# Patient Record
Sex: MALE | Race: WHITE | Employment: OTHER | URBAN - METROPOLITAN AREA
[De-identification: names, ages, dates, MRNs, and addresses within clinical notes are randomized per-mention and may not be internally consistent; named-entity substitution may affect disease eponyms.]

---

## 2021-06-14 RX ORDER — ATORVASTATIN CALCIUM 10 MG/1
TABLET, FILM COATED ORAL DAILY
COMMUNITY

## 2021-06-14 RX ORDER — CALC/MAG/B COMPLEX/D3/HERB 61
TABLET ORAL
COMMUNITY
End: 2021-06-22

## 2021-06-14 RX ORDER — PERINDOPRIL ERBUMINE 4 MG/1
4 TABLET ORAL DAILY
COMMUNITY

## 2021-06-14 NOTE — PROGRESS NOTES
[address]  Endoscopy Preprocedure Instructions      1. On the day of your procedure, please report to registration located on the 2nd floor of the  [facility]. yes    2. You must have a responsible adult to drive you to the hospital, stay at the hospital during your procedure and drive you home. If they leave your procedure will not be started (no exceptions). yes    3. Do not have anything to eat or drink (including water, gum, mints, coffee, and juice) after midnight. This does not apply to the medications you were instructed to take by your physician. yesIf you are currently taking Plavix, Coumadin, Aspirin, or other blood-thinning agents, contact your physician for special instructions. yes    4. If you are having a procedure that requires bowel prep: We recommend that you have only clear liquids the day before your procedure and begin your bowel prep by 5:00 pm.  You may continue to drink clear liquids until midnight. If for any reason you are not able to complete your prep please notify your physician immediately. yes    5. Have a list of all current medications, including vitamins, herbal supplements and any other over the counter medications. yes    6. If you wear glasses, contacts, dentures and/or hearing aids, they may be removed prior to procedure, please bring a case to store them in. yes    7. You should understand that if you do not follow these instructions your procedure may be cancelled. If your physical condition changes (I.e.difficulty breathing, fever, cold or flu) please contact your doctor as soon as possible. 8. It is important that you be on time. If for any reason you are unable to keep your appointment please call your physicians office prior to your scheduled procedure    Gordo (daughter) - asked to talked her dad to get a rapid coivid-19 test before the procedure   since he is not fully vaccinated.

## 2021-06-15 ENCOUNTER — HOSPITAL ENCOUNTER (OUTPATIENT)
Age: 76
Setting detail: OUTPATIENT SURGERY
Discharge: HOME | End: 2021-06-15
Attending: INTERNAL MEDICINE | Admitting: INTERNAL MEDICINE
Payer: MEDICAID

## 2021-06-15 ENCOUNTER — ANESTHESIA (OUTPATIENT)
Dept: ENDOSCOPY | Age: 76
End: 2021-06-15
Payer: MEDICAID

## 2021-06-15 ENCOUNTER — ANESTHESIA EVENT (OUTPATIENT)
Dept: ENDOSCOPY | Age: 76
End: 2021-06-15
Payer: MEDICAID

## 2021-06-15 VITALS
BODY MASS INDEX: 25.03 KG/M2 | HEIGHT: 70 IN | TEMPERATURE: 98.6 F | DIASTOLIC BLOOD PRESSURE: 68 MMHG | OXYGEN SATURATION: 100 % | SYSTOLIC BLOOD PRESSURE: 136 MMHG | WEIGHT: 174.82 LBS | HEART RATE: 58 BPM | RESPIRATION RATE: 14 BRPM

## 2021-06-15 PROCEDURE — 88305 TISSUE EXAM BY PATHOLOGIST: CPT

## 2021-06-15 PROCEDURE — 74011250636 HC RX REV CODE- 250/636: Performed by: INTERNAL MEDICINE

## 2021-06-15 PROCEDURE — 77030021593 HC FCPS BIOP ENDOSC BSC -A: Performed by: INTERNAL MEDICINE

## 2021-06-15 PROCEDURE — 74011250636 HC RX REV CODE- 250/636: Performed by: NURSE ANESTHETIST, CERTIFIED REGISTERED

## 2021-06-15 PROCEDURE — 74011000250 HC RX REV CODE- 250: Performed by: NURSE ANESTHETIST, CERTIFIED REGISTERED

## 2021-06-15 PROCEDURE — 76040000019: Performed by: INTERNAL MEDICINE

## 2021-06-15 PROCEDURE — 2709999900 HC NON-CHARGEABLE SUPPLY: Performed by: INTERNAL MEDICINE

## 2021-06-15 PROCEDURE — 77030013992 HC SNR POLYP ENDOSC BSC -B: Performed by: INTERNAL MEDICINE

## 2021-06-15 PROCEDURE — 76060000031 HC ANESTHESIA FIRST 0.5 HR: Performed by: INTERNAL MEDICINE

## 2021-06-15 RX ORDER — DEXTROMETHORPHAN/PSEUDOEPHED 2.5-7.5/.8
1.2 DROPS ORAL
Status: DISCONTINUED | OUTPATIENT
Start: 2021-06-15 | End: 2021-06-15 | Stop reason: HOSPADM

## 2021-06-15 RX ORDER — EPINEPHRINE 0.1 MG/ML
1 INJECTION INTRACARDIAC; INTRAVENOUS
Status: DISCONTINUED | OUTPATIENT
Start: 2021-06-15 | End: 2021-06-15 | Stop reason: HOSPADM

## 2021-06-15 RX ORDER — ATROPINE SULFATE 0.1 MG/ML
0.4 INJECTION INTRAVENOUS
Status: DISCONTINUED | OUTPATIENT
Start: 2021-06-15 | End: 2021-06-15 | Stop reason: HOSPADM

## 2021-06-15 RX ORDER — PHENYLEPHRINE HCL IN 0.9% NACL 0.4MG/10ML
SYRINGE (ML) INTRAVENOUS AS NEEDED
Status: DISCONTINUED | OUTPATIENT
Start: 2021-06-15 | End: 2021-06-15 | Stop reason: HOSPADM

## 2021-06-15 RX ORDER — LIDOCAINE HYDROCHLORIDE 20 MG/ML
INJECTION, SOLUTION EPIDURAL; INFILTRATION; INTRACAUDAL; PERINEURAL AS NEEDED
Status: DISCONTINUED | OUTPATIENT
Start: 2021-06-15 | End: 2021-06-15 | Stop reason: HOSPADM

## 2021-06-15 RX ORDER — SODIUM CHLORIDE 9 MG/ML
50 INJECTION, SOLUTION INTRAVENOUS CONTINUOUS
Status: DISCONTINUED | OUTPATIENT
Start: 2021-06-15 | End: 2021-06-15 | Stop reason: HOSPADM

## 2021-06-15 RX ORDER — MIDAZOLAM HYDROCHLORIDE 1 MG/ML
.25-5 INJECTION, SOLUTION INTRAMUSCULAR; INTRAVENOUS
Status: DISCONTINUED | OUTPATIENT
Start: 2021-06-15 | End: 2021-06-15 | Stop reason: HOSPADM

## 2021-06-15 RX ORDER — PROPOFOL 10 MG/ML
INJECTION, EMULSION INTRAVENOUS
Status: DISCONTINUED | OUTPATIENT
Start: 2021-06-15 | End: 2021-06-15 | Stop reason: HOSPADM

## 2021-06-15 RX ORDER — PROPOFOL 10 MG/ML
INJECTION, EMULSION INTRAVENOUS AS NEEDED
Status: DISCONTINUED | OUTPATIENT
Start: 2021-06-15 | End: 2021-06-15 | Stop reason: HOSPADM

## 2021-06-15 RX ORDER — NALOXONE HYDROCHLORIDE 0.4 MG/ML
0.4 INJECTION, SOLUTION INTRAMUSCULAR; INTRAVENOUS; SUBCUTANEOUS
Status: DISCONTINUED | OUTPATIENT
Start: 2021-06-15 | End: 2021-06-15 | Stop reason: HOSPADM

## 2021-06-15 RX ORDER — FLUMAZENIL 0.1 MG/ML
0.2 INJECTION INTRAVENOUS
Status: DISCONTINUED | OUTPATIENT
Start: 2021-06-15 | End: 2021-06-15 | Stop reason: HOSPADM

## 2021-06-15 RX ADMIN — Medication 80 MCG: at 09:31

## 2021-06-15 RX ADMIN — PROPOFOL INJECTABLE EMULSION 140 MCG/KG/MIN: 10 INJECTION, EMULSION INTRAVENOUS at 09:14

## 2021-06-15 RX ADMIN — SODIUM CHLORIDE: 9 INJECTION, SOLUTION INTRAVENOUS at 09:00

## 2021-06-15 RX ADMIN — Medication 120 MCG: at 09:23

## 2021-06-15 RX ADMIN — LIDOCAINE HYDROCHLORIDE 40 MG: 20 INJECTION, SOLUTION INTRAVENOUS at 09:13

## 2021-06-15 RX ADMIN — Medication 80 MCG: at 09:36

## 2021-06-15 RX ADMIN — PROPOFOL INJECTABLE EMULSION 80 MG: 10 INJECTION, EMULSION INTRAVENOUS at 09:14

## 2021-06-15 NOTE — ANESTHESIA POSTPROCEDURE EVALUATION
Procedure(s):  ESOPHAGOGASTRODUODENOSCOPY (EGD)  COLONOSCOPY  ESOPHAGOGASTRODUODENAL (EGD) BIOPSY  ENDOSCOPIC POLYPECTOMY. MAC    <BSHSIANPOST>    INITIAL Post-op Vital signs:   Vitals Value Taken Time   /68 06/15/21 0958   Temp 37 °C (98.6 °F) 06/15/21 0943   Pulse 54 06/15/21 1000   Resp 18 06/15/21 1000   SpO2 99 % 06/15/21 1000   Vitals shown include unvalidated device data.

## 2021-06-15 NOTE — H&P
Lilly Spain M.D.  [phone]            History and Physical       NAME:  Pham Mauricio   :      MRN:   [id number]       Referring Physician:  None      Consult Date: 6/15/2021 8:40 AM    Chief Complaint:  Early satiety & Colon cancer screening    History of Present Illness:  Patient is a 78 y. o. who is seen for early satiety& colon cancer screening. Denies any ongoing GI complaints. PMH:  Past Medical History:   Diagnosis Date    Hypertension     Ill-defined condition     High cholesterol        PSH:  History reviewed. No pertinent surgical history. Allergies:  No Known Allergies    Home Medications:  Prior to Admission Medications   Prescriptions Last Dose Informant Patient Reported? Taking?   atorvastatin (LIPITOR) 10 mg tablet 2021 at Unknown time  Yes Yes   Sig: Take  by mouth daily. lansoprazole (Prevacid) 15 mg capsule 2021 at Unknown time  Yes Yes   Sig: Take  by mouth Daily (before breakfast). perindopril (ACEON) 4 mg tablet 2021 at Unknown time  Yes Yes   Sig: Take 4 mg by mouth daily. Facility-Administered Medications: None       Hospital Medications:  No current facility-administered medications for this encounter. Social History:  Social History     Tobacco Use    Smoking status: Never Smoker    Smokeless tobacco: Never Used   Substance Use Topics    Alcohol use: Never       Family History:  History reviewed. No pertinent family history.           Review of Systems:      Constitutional: negative fever, negative chills, negative weight loss  Eyes:   negative visual changes  ENT:   negative sore throat, tongue or lip swelling  Respiratory:  negative cough, negative dyspnea  Cards:  negative for chest pain, palpitations, lower extremity edema  GI:   See HPI  :  negative for frequency, dysuria  Integument:  negative for rash and pruritus  Heme:  negative for easy bruising and gum/nose bleeding  Musculoskel: negative for myalgias,  back pain and muscle weakness  Neuro: negative for headaches, dizziness, vertigo  Psych:  negative for feelings of anxiety, depression       Objective:     Patient Vitals for the past 8 hrs:   BP Temp Pulse Resp SpO2 Height Weight   06/15/21 0834 (!) 155/78 98.8 °F (37.1 °C) 78 18 100 % 5' 10\" (1.778 m) 79.3 kg (174 lb 13.2 oz)     No intake/output data recorded. No intake/output data recorded. EXAM:     NEURO-a&o   HEENT-wnl   LUNGS-clear    COR-regular rate and rhythym     ABD-soft , no tenderness, no rebound, good bs     EXT-no edema     Data Review     No results for input(s): WBC, HGB, HCT, PLT, HGBEXT, HCTEXT, PLTEXT in the last 72 hours. No results for input(s): NA, K, CL, CO2, BUN, CREA, GLU, PHOS, CA in the last 72 hours. No results for input(s): AP, TBIL, TP, ALB, GLOB, GGT, AML, LPSE in the last 72 hours. No lab exists for component: SGOT, GPT, AMYP, HLPSE  No results for input(s): INR, PTP, APTT, INREXT in the last 72 hours. There is no problem list on file for this patient. Assessment:   · Early satiety  · Colon cancer screening   Plan:   · EGD  · Colonoscopy today.      Signed By: Artelia Essex, MD     6/15/2021  8:40 AM

## 2021-06-15 NOTE — PROGRESS NOTES

## 2021-06-15 NOTE — ANESTHESIA PREPROCEDURE EVALUATION
Relevant Problems   No relevant active problems       Anesthetic History   No history of anesthetic complications            Review of Systems / Medical History  Patient summary reviewed, nursing notes reviewed and pertinent labs reviewed    Pulmonary  Within defined limits                 Neuro/Psych   Within defined limits           Cardiovascular    Hypertension              Exercise tolerance: >4 METS     GI/Hepatic/Renal  Within defined limits              Endo/Other  Within defined limits           Other Findings              Physical Exam    Airway  Mallampati: II    Neck ROM: normal range of motion   Mouth opening: Normal     Cardiovascular  Regular rate and rhythm,  S1 and S2 normal,  no murmur, click, rub, or gallop  Rhythm: regular  Rate: normal         Dental  No notable dental hx       Pulmonary  Breath sounds clear to auscultation               Abdominal  GI exam deferred       Other Findings            Anesthetic Plan    ASA: 2  Anesthesia type: MAC          Induction: Intravenous  Anesthetic plan and risks discussed with: Patient

## 2021-06-15 NOTE — DISCHARGE INSTRUCTIONS
[facility]. Mima Mahoney M.D.  [phone]                 COLON and EGD DISCHARGE INSTRUCTIONS    6/15/2021    Zamzam Marie  :  1945  [facility] Medical Record Number:  [id number]      DISCOMFORT:  Sore throat- throat lozenges or warm salt water gargle  Redness at IV site- apply warm compress to area; if redness or soreness persist- contact your physician  There may be a slight amount of blood passed from the rectum  Gaseous discomfort- walking, belching will help relieve any discomfort  You may not operate a vehicle for 12 hours  You may not engage in an occupation involving machinery or appliances for rest of today  You may not drink alcoholic beverages for at least 12 hours  Avoid making any critical decisions for at least 24 hour  DIET:   High fiber diet. - however -  remember your colon is empty and a heavy meal will produce gas. Avoid these foods:  vegetables, fried / greasy foods, carbonated drinks for today     ACTIVITY:  You may  resume your normal daily activities it is recommended that you spend the remainder of the day resting -  avoid any strenuous activity and driving. CALL M.D. ANY SIGN OF:   Increasing pain, nausea, vomiting  Abdominal distension (swelling)  New increased bleeding (oral or rectal)  Fever (chills)  Pain in chest area  Bloody discharge from nose or mouth  Shortness of breath      Follow-up Instructions:   Call Dr. Solange Chavez if any questions at [phone]. Results of procedure / biopsy in 7 to 10 days, we will call you with these results.   Your endoscopy showed normal exam   Your colonoscopy showed 4 polyps removed

## 2021-06-15 NOTE — PROCEDURES
Lilly Spain M.D.  [phone]            6/15/2021          Colonoscopy Operative Report  Pham Mauricio  :  1945  [facility] Medical Record Number:  [id number]      Indications:    Screening colonoscopy     :  Lonnie Davis MD    Assistant -- None  Implants -- None    Referring Provider: None    Sedation:  MAC anesthesia Propofol    Pre-Procedural Exam:      Airway: clear,  No airway problems anticipated  Heart: RRR, without gallops or rubs  Lungs: clear bilaterally without wheezes, crackles, or rhonchi  Abdomen: soft, nontender, nondistended, bowel sounds present  Mental Status: awake, alert and oriented to person, place and time     Procedure Details:  After informed consent was obtained with all risks and benefits of procedure explained and preoperative exam completed, the patient was taken to the endoscopy suite and placed in the left lateral decubitus position. Upon sequential sedation as per above, a digital rectal exam was performed. The Olympus videocolonoscope  was inserted in the rectum and carefully advanced to the terminal ileum. The quality of preparation was good. The colonoscope was slowly withdrawn with careful inspection and evaluation between folds. Retroflexion in the rectum was performed. Findings:   Terminal Ileum: normal  Cecum: normal  Ascending Colon: 3  Sessile polyp(s), the largest 9 mm in size;  Transverse Colon: 1  Sessile polyp(s), the largest 9 mm in size; Descending Colon: normal  Sigmoid: normal  Rectum: normal    Interventions:  4 complete polypectomy were performed using cold snare  and the polyps were  retrieved    Specimen Removed:  specimen #1, 9 mm in size, located in the ascending colon and the transverse colon removed by cold snare and retrieved for pathology    Complications: None. EBL:  None. Impression:  4 polyps removed as above    Recommendations:  -Await pathology. -Repeat colonoscopy in 3 years. -High fiber diet.    -Resume normal medication(s). Discharge Disposition:  Home in the company of a  when able to ambulate.     Cecy Bazan MD  6/15/2021  9:42 AM

## 2021-06-15 NOTE — PROGRESS NOTES
[place] Leader  1945  [id number]    Situation:  Verbal report received from:   Fernando Epperson RN   Procedure: Procedure(s):  ESOPHAGOGASTRODUODENOSCOPY (EGD)  COLONOSCOPY  ESOPHAGOGASTRODUODENAL (EGD) BIOPSY  ENDOSCOPIC POLYPECTOMY    Background:    Preoperative diagnosis: colon screening  Postoperative diagnosis: 1.- Nornal EGD  2.- Colonic Polyps    :  Dr. Ricardo Wilson   Assistant(s): Endoscopy RN-1: Jeremy Pires RN  Endoscopy RN-2: Eric Bowen RN    Specimens:   ID Type Source Tests Collected by Time Destination   1 : Duodenum Biopsy Preservative   Keyonna Benz MD 6/15/2021 3858 Pathology   2 : Gastric Biopsy Presyingative   Keyonna Benz MD 6/15/2021 0021 Pathology   3 : Ascending Colon Polyps x 3 Preservative   Keyonna Benz MD 6/15/2021 9403 Pathology   4 : Transverse Colon Polyp Preservative   Keyonna Benz MD 6/15/2021 0935 Pathology     H. Pylori  no    Assessment:  Intra-procedure medications       Anesthesia gave intra-procedure sedation and medications, see anesthesia flow sheet yes    Intravenous fluids: NS@ KVO     Vital signs stable   yes    Abdominal assessment: round and soft   yes    Recommendation:  Discharge patient per MD order  yes.   Return to floor  outpatient  Family or Friend   spouse  Permission to share finding with family or friend yes

## 2021-06-15 NOTE — PROCEDURES
Angie Mcdowell M.D.  [phone]           6/15/2021                EGD Operative Report  Nadia Ritter  :  1945  [facility] Medical Record Number:  [id number]      Indication: early satiety     : Juan Clemens MD    Assistant -- None  Implants -- None    Referring Provider:  None      Anesthesia/Sedation:  MAC anesthesia Propofol    Airway assessment: No airway problems anticipated    Pre-Procedural Exam:      Airway: clear, no airway problems anticipated  Heart: RRR, without gallops or rubs  Lungs: clear bilaterally without wheezes, crackles, or rhonchi  Abdomen: soft, nontender, nondistended, bowel sounds present  Mental Status: awake, alert and oriented to person, place and time       Procedure Details     After infomed consent was obtained for the procedure, with all risks and benefits of procedure explained the patient was taken to the endoscopy suite and placed in the left lateral decubitus position. Following sequential administration of sedation as per above, the endoscope was inserted into the mouth and advanced under direct vision to second portion of the duodenum. A careful inspection was made as the gastroscope was withdrawn, including a retroflexed view of the proximal stomach; findings and interventions are described below. Findings:   Esophagus:normal  Stomach: normal   Duodenum/jejunum: normal    Therapies:  biopsy of stomach - r/o H.pylori  biopsy of duodenal - r/o celiac disease    Specimens: as above           Complications:   None; patient tolerated the procedure well. EBL:  None.            Impression:    1.- Nornal EGD      Recommendations:    -Await pathology.  -Proceed with colonoscopy today as planned    Juan Clemens MD

## 2021-06-15 NOTE — PROGRESS NOTES
Endoscopy discharge instructions have been reviewed and given to patient. The patient verbalized understanding and acceptance of instructions. Dr. Ophelia Castillo  discussed with patient procedure findings and next steps.

## 2021-06-22 ENCOUNTER — OFFICE VISIT (OUTPATIENT)
Dept: CARDIOLOGY CLINIC | Age: 76
End: 2021-06-22
Payer: MEDICAID

## 2021-06-22 ENCOUNTER — TELEPHONE (OUTPATIENT)
Dept: CARDIOLOGY CLINIC | Age: 76
End: 2021-06-22

## 2021-06-22 VITALS
WEIGHT: 180.1 LBS | HEART RATE: 60 BPM | HEIGHT: 70 IN | BODY MASS INDEX: 25.78 KG/M2 | RESPIRATION RATE: 16 BRPM | SYSTOLIC BLOOD PRESSURE: 140 MMHG | DIASTOLIC BLOOD PRESSURE: 70 MMHG | OXYGEN SATURATION: 99 %

## 2021-06-22 DIAGNOSIS — R00.2 HEART PALPITATIONS: ICD-10-CM

## 2021-06-22 DIAGNOSIS — E78.2 MIXED HYPERLIPIDEMIA: ICD-10-CM

## 2021-06-22 DIAGNOSIS — I10 ESSENTIAL HYPERTENSION: ICD-10-CM

## 2021-06-22 DIAGNOSIS — R07.2 PRECORDIAL PAIN: Primary | ICD-10-CM

## 2021-06-22 PROCEDURE — 93000 ELECTROCARDIOGRAM COMPLETE: CPT | Performed by: INTERNAL MEDICINE

## 2021-06-22 RX ORDER — DICLOFENAC SODIUM 75 MG/1
75 TABLET, DELAYED RELEASE ORAL AS NEEDED
COMMUNITY
Start: 2021-06-17

## 2021-06-22 RX ORDER — AMOXICILLIN 500 MG/1
500 CAPSULE ORAL 2 TIMES DAILY
COMMUNITY
Start: 2021-06-17

## 2021-06-22 RX ORDER — CLARITHROMYCIN 500 MG/1
500 TABLET, FILM COATED ORAL 2 TIMES DAILY
COMMUNITY
Start: 2021-06-17

## 2021-06-22 NOTE — TELEPHONE ENCOUNTER
Called and spoke to the daughter and told her he is  scheduled for his echo at 8am and stress test at 9:30 tomorrow.     Regards,  godfrey

## 2021-06-22 NOTE — PROGRESS NOTES
Chief Complaint   Patient presents with    Referral / Consult     Patient C/O chest pain , increase heart rate, tingling numbness in hands and back of neck.

## 2021-06-23 ENCOUNTER — ANCILLARY PROCEDURE (OUTPATIENT)
Dept: CARDIOLOGY CLINIC | Age: 76
End: 2021-06-23
Payer: MEDICAID

## 2021-06-23 ENCOUNTER — ANCILLARY PROCEDURE (OUTPATIENT)
Dept: CARDIOLOGY CLINIC | Age: 76
End: 2021-06-23

## 2021-06-23 VITALS
HEIGHT: 70 IN | WEIGHT: 180 LBS | DIASTOLIC BLOOD PRESSURE: 70 MMHG | SYSTOLIC BLOOD PRESSURE: 140 MMHG | BODY MASS INDEX: 25.77 KG/M2

## 2021-06-23 VITALS
SYSTOLIC BLOOD PRESSURE: 148 MMHG | HEIGHT: 70 IN | DIASTOLIC BLOOD PRESSURE: 82 MMHG | BODY MASS INDEX: 25.77 KG/M2 | WEIGHT: 180 LBS

## 2021-06-23 DIAGNOSIS — E78.2 MIXED HYPERLIPIDEMIA: ICD-10-CM

## 2021-06-23 DIAGNOSIS — R00.2 HEART PALPITATIONS: ICD-10-CM

## 2021-06-23 DIAGNOSIS — R07.2 PRECORDIAL PAIN: ICD-10-CM

## 2021-06-23 DIAGNOSIS — I10 ESSENTIAL HYPERTENSION: ICD-10-CM

## 2021-06-23 PROCEDURE — 78452 HT MUSCLE IMAGE SPECT MULT: CPT | Performed by: INTERNAL MEDICINE

## 2021-06-23 PROCEDURE — 93306 TTE W/DOPPLER COMPLETE: CPT | Performed by: INTERNAL MEDICINE

## 2021-06-23 PROCEDURE — 93015 CV STRESS TEST SUPVJ I&R: CPT | Performed by: INTERNAL MEDICINE

## 2021-06-23 PROCEDURE — A9502 TC99M TETROFOSMIN: HCPCS | Performed by: INTERNAL MEDICINE

## 2021-06-24 ENCOUNTER — HOSPITAL ENCOUNTER (OUTPATIENT)
Dept: GENERAL RADIOLOGY | Age: 76
Discharge: HOME | End: 2021-06-24
Attending: INTERNAL MEDICINE
Payer: MEDICAID

## 2021-06-24 ENCOUNTER — TELEPHONE (OUTPATIENT)
Dept: CARDIOLOGY CLINIC | Age: 76
End: 2021-06-24

## 2021-06-24 ENCOUNTER — OFFICE VISIT (OUTPATIENT)
Dept: PRIMARY CARE CLINIC | Age: 76
End: 2021-06-24
Payer: MEDICAID

## 2021-06-24 ENCOUNTER — TELEPHONE (OUTPATIENT)
Dept: PRIMARY CARE CLINIC | Age: 76
End: 2021-06-24

## 2021-06-24 VITALS
OXYGEN SATURATION: 99 % | HEART RATE: 69 BPM | DIASTOLIC BLOOD PRESSURE: 76 MMHG | WEIGHT: 179 LBS | HEIGHT: 70 IN | RESPIRATION RATE: 18 BRPM | BODY MASS INDEX: 25.62 KG/M2 | SYSTOLIC BLOOD PRESSURE: 133 MMHG | TEMPERATURE: 97.9 F

## 2021-06-24 DIAGNOSIS — I10 ESSENTIAL HYPERTENSION: ICD-10-CM

## 2021-06-24 DIAGNOSIS — E55.9 VITAMIN D DEFICIENCY: ICD-10-CM

## 2021-06-24 DIAGNOSIS — R68.82 LOSS OF LIBIDO: ICD-10-CM

## 2021-06-24 DIAGNOSIS — R73.03 PREDIABETES: ICD-10-CM

## 2021-06-24 DIAGNOSIS — R61 NIGHT SWEATS: ICD-10-CM

## 2021-06-24 DIAGNOSIS — D36.9 TUBULAR ADENOMA: ICD-10-CM

## 2021-06-24 DIAGNOSIS — Z12.5 PROSTATE CANCER SCREENING: ICD-10-CM

## 2021-06-24 DIAGNOSIS — Z00.00 ANNUAL PHYSICAL EXAM: Primary | ICD-10-CM

## 2021-06-24 DIAGNOSIS — A04.8 H. PYLORI INFECTION: ICD-10-CM

## 2021-06-24 DIAGNOSIS — E78.00 HYPERCHOLESTEREMIA: ICD-10-CM

## 2021-06-24 DIAGNOSIS — R06.09 DOE (DYSPNEA ON EXERTION): ICD-10-CM

## 2021-06-24 DIAGNOSIS — H91.93 BILATERAL HEARING LOSS, UNSPECIFIED HEARING LOSS TYPE: ICD-10-CM

## 2021-06-24 LAB
ECHO AO ASC DIAM: 3.16 CM
ECHO AO ROOT DIAM: 2.58 CM
ECHO AV AREA PEAK VELOCITY: 2.55 CM2
ECHO AV AREA/BSA PEAK VELOCITY: 1.3 CM2/M2
ECHO AV PEAK GRADIENT: 6.82 MMHG
ECHO AV PEAK VELOCITY: 130.6 CM/S
ECHO LA AREA 4C: 19.53 CM2
ECHO LA MAJOR AXIS: 3.51 CM
ECHO LA MINOR AXIS: 1.76 CM
ECHO LA VOL 2C: 48.06 ML (ref 18–58)
ECHO LA VOL 4C: 51.02 ML (ref 18–58)
ECHO LA VOL BP: 55.02 ML (ref 18–58)
ECHO LA VOL/BSA BIPLANE: 27.51 ML/M2 (ref 16–28)
ECHO LA VOLUME INDEX A2C: 24.03 ML/M2 (ref 16–28)
ECHO LA VOLUME INDEX A4C: 25.51 ML/M2 (ref 16–28)
ECHO LV E' LATERAL VELOCITY: 12.82 CM/S
ECHO LV E' SEPTAL VELOCITY: 9.34 CM/S
ECHO LV INTERNAL DIMENSION DIASTOLIC: 4.62 CM (ref 4.2–5.9)
ECHO LV INTERNAL DIMENSION SYSTOLIC: 3.12 CM
ECHO LV IVSD: 1.07 CM (ref 0.6–1)
ECHO LV MASS 2D: 172.2 G (ref 88–224)
ECHO LV MASS INDEX 2D: 86.1 G/M2 (ref 49–115)
ECHO LV POSTERIOR WALL DIASTOLIC: 1.04 CM (ref 0.6–1)
ECHO LVOT DIAM: 2 CM
ECHO LVOT PEAK GRADIENT: 4.53 MMHG
ECHO LVOT PEAK VELOCITY: 106.37 CM/S
ECHO LVOT SV: 78.8 ML
ECHO LVOT VTI: 25.14 CM
ECHO MV A VELOCITY: 64.57 CM/S
ECHO MV E DECELERATION TIME (DT): 225.4 MS
ECHO MV E VELOCITY: 84.63 CM/S
ECHO MV E/A RATIO: 1.31
ECHO MV E/E' LATERAL: 6.6
ECHO MV E/E' RATIO (AVERAGED): 7.83
ECHO MV E/E' SEPTAL: 9.06
ECHO RA AREA 4C: 13.73 CM2
ECHO RV TAPSE: 2.5 CM (ref 1.5–2)
LA VOL DISK BP: 51.13 ML (ref 18–58)
LVOT MG: 2.49 MMHG
STRESS BASELINE DIAS BP: 82 MMHG
STRESS BASELINE HR: 60 BPM
STRESS BASELINE SYS BP: 148 MMHG
STRESS PEAK DIAS BP: 82 MMHG
STRESS PEAK SYS BP: 148 MMHG
STRESS PERCENT HR ACHIEVED: 64 %
STRESS POST PEAK HR: 93 BPM
STRESS RATE PRESSURE PRODUCT: NORMAL BPM*MMHG
STRESS ST DEPRESSION: 0 MM
STRESS ST ELEVATION: 0 MM
STRESS TARGET HR: 146 BPM

## 2021-06-24 PROCEDURE — 99387 INIT PM E/M NEW PAT 65+ YRS: CPT | Performed by: INTERNAL MEDICINE

## 2021-06-24 PROCEDURE — 99213 OFFICE O/P EST LOW 20 MIN: CPT | Performed by: INTERNAL MEDICINE

## 2021-06-24 PROCEDURE — 71046 X-RAY EXAM CHEST 2 VIEWS: CPT

## 2021-06-24 NOTE — PROGRESS NOTES
Nadia Ritter is a 78 y.o.  male and presents with     Chief Complaint   Patient presents with    New Patient    Hearing Problem     probelm hearing x 5 months    Hypertension    Cholesterol Problem    Excessive Sweating    Insomnia    Complete Physical     Patient is is here to establish care. He is accompanied by his daughter. He was having some night sweats for the past 3 to 4 months and occasional chest tightness. He went and saw cardiologist has had a stress test and echo done that were normal.  Cardiologist ordered labs to rule out thyroid problem. He has a history of hypertension and hypercholesterolemia for which he takes medications and denies side effects  He is having problems with his hearing. He has good appetite and has not had no weight loss. He had an endoscopy that showed H. pylori and is currently taking medications. Colonoscopy showed tubular adenoma. He does not smoke or drink. No family history for cancer. He does feel fatigued and tired sometimes. There is a strong family history for diabetes and patient is worried of having diabetes. He thinks that could be causing night sweats. However he is currently not taking any medication that would drop his sugars. Past Medical History:   Diagnosis Date    Hyperlipidemia     Hypertension     Ill-defined condition     High cholesterol      Past Surgical History:   Procedure Laterality Date    COLONOSCOPY N/A 6/15/2021    COLONOSCOPY performed by Stephania Laureano MD at [facility] ENDOSCOPY     Current Outpatient Medications   Medication Sig    OMEPRAZOLE PO Take  by mouth two (2) times a day.  perindopril (ACEON) 4 mg tablet Take 4 mg by mouth daily.  atorvastatin (LIPITOR) 10 mg tablet Take  by mouth daily.  amoxicillin (AMOXIL) 500 mg capsule Take 500 mg by mouth two (2) times a day. (Patient not taking: Reported on 6/24/2021)    clarithromycin (BIAXIN) 500 mg tablet Take 500 mg by mouth two (2) times a day.     diclofenac EC (VOLTAREN) 75 mg EC tablet Take 75 mg by mouth as needed. No current facility-administered medications for this visit. Health Maintenance   Topic Date Due    Hepatitis C Screening  Never done    Lipid Screen  Never done    DTaP/Tdap/Td series (1 - Tdap) Never done    Shingrix Vaccine Age 50> (1 of 2) Never done    Pneumococcal 65+ years (1 of 1 - PPSV23) Never done    Flu Vaccine (Season Ended) 09/01/2021    Colorectal Cancer Screening Combo  06/15/2031    COVID-19 Vaccine  Completed     Immunization History   Administered Date(s) Administered    Covid-19, MODERNA, Mrna, Lnp-s, Pf, 100mcg/0.5mL 04/01/2021, 04/30/2021     No LMP for male patient. Allergies and Intolerances:   No Known Allergies    Family History:   No family history on file. Social History:   He  reports that he has never smoked. He has never used smokeless tobacco.  He  reports no history of alcohol use.             Review of Systems:   General: negative for - chills, fatigue, fever, weight change  Psych: negative for - anxiety, depression, irritability or mood swings  ENT: negative for - headaches, hearing change, nasal congestion, oral lesions, sneezing or sore throat  Heme/ Lymph: negative for - bleeding problems, bruising, pallor or swollen lymph nodes  Endo: negative for - hot flashes, polydipsia/polyuria or temperature intolerance  Resp: negative for - cough, shortness of breath or wheezing  CV: negative for - chest pain, edema or palpitations  GI: negative for - abdominal pain, change in bowel habits, constipation, diarrhea or nausea/vomiting  : negative for - dysuria, hematuria, incontinence, pelvic pain or vulvar/vaginal symptoms  MSK: negative for - joint pain, joint swelling or muscle pain  Neuro: negative for - confusion, headaches, seizures or weakness  Derm: negative for - dry skin, hair changes, rash or skin lesion changes          Physical:   Vitals:   Vitals:    06/24/21 1139   BP: 133/76 Pulse: 69   Resp: 18   Temp: 97.9 °F (36.6 °C)   TempSrc: Temporal   SpO2: 99%   Weight: 179 lb (81.2 kg)   Height: 5' 10\" (1.778 m)           Exam:   HEENT- atraumatic,normocephalic, awake, oriented, well nourished, cataract left eye, no evidence of ear infection or earwax, eardrum appears normal  Neck - supple,no enlarged lymph nodes, no JVD, no thyromegaly  Chest- CTA, no rhonchi, no crackles  Heart- rrr, no murmurs / gallop/rub  Abdomen- soft,BS+,NT, no hepatosplenomegaly  Ext - no c/c/edema   Neuro- no focal deficits. Power 5/5 all extremities  Skin - warm,dry, no obvious rashes. Mild axillary fullness on the right side. Review of Data:   LABS:   No results found for: WBC, HGB, HCT, PLT, HGBEXT, HCTEXT, PLTEXT, HGBEXT, HCTEXT, PLTEXT  No results found for: NA, K, CL, CO2, GLU, BUN, CREA  No results found for: CHOL, CHOLX, CHLST, CHOLV, HDL, HDLP, LDL, LDLC, DLDLP, TGLX, TRIGL, TRIGP  No components found for: GPT        Impression / Plan:        ICD-10-CM ICD-9-CM    1. Annual physical exam  Z00.00 V70.0 HEMOGLOBIN A1C WITH EAG   2. Vitamin D deficiency  E55.9 268.9 VITAMIN D, 25 HYDROXY   3. Prostate cancer screening  Z12.5 V76.44 PSA, DIAGNOSTIC (PROSTATE SPECIFIC AG)   4. Night sweats  R61 780.8    5. Prediabetes  R73.03 790.29 HEMOGLOBIN A1C WITH EAG   6. Bilateral hearing loss, unspecified hearing loss type  H91.93 389.9 REFERRAL TO AUDIOLOGY   7. Essential hypertension  I10 401.9    8. Hypercholesteremia  E78.00 272.0    9. Tubular adenoma  D36.9 229.9    10. H. pylori infection  A04.8 041.86    11. VANG (dyspnea on exertion)  R06.00 786.09 XR CHEST PA LAT   12. Loss of libido  R68.82 799.81 TESTOSTERONE, FREE & TOTAL         Explained to patient risk benefits of the medications. Advised patient to stop meds if having any side effects. Pt verbalized understanding of the instructions. I have discussed the diagnosis with the patient and the intended plan as seen in the above orders.   The patient has received an after-visit summary and questions were answered concerning future plans. I have discussed medication side effects and warnings with the patient as well. I have reviewed the plan of care with the patient, accepted their input and they are in agreement with the treatment goals. Reviewed plan of care. Patient has provided input and agrees with goals. Follow-up and Dispositions    · Return in about 2 months (around 8/24/2021).          Carrington Canales MD

## 2021-06-24 NOTE — PROGRESS NOTES
Chief Complaint   Patient presents with   [facility] Patient    Hearing Problem     probelm hearing x 5 months        Visit Vitals  /76 (BP 1 Location: Left upper arm, BP Patient Position: Sitting)   Pulse 69   Temp 97.9 °F (36.6 °C) (Temporal)   Resp 18   Ht 5' 10\" (1.778 m)   Wt 179 lb (81.2 kg)   SpO2 99%   BMI 25.68 kg/m²

## 2021-06-24 NOTE — TELEPHONE ENCOUNTER
----- Message from Ned Rondon NP sent at 6/24/2021  2:43 PM EDT -----  Echo showed normal pumping heart strength. Valves look good and healthy. Continue good bp control. Message  Received: Today  Sai Denson NP sent to Avelino Ruiz LPN  Please call the patient and inform his stress test was normal.  Low concern for flow limiting blockages in the arteries of the heart. Called pt, spoke to [address] [place] [address], advised echo is normal, normal pumping strength, normal healthy valves. Stress test is normal, low concern for any blockages in arteries of the heart. . daughter verbalized understanding.

## 2021-06-24 NOTE — PROGRESS NOTES
Please call the patient and inform his stress test was normal.  Low concern for flow limiting blockages in the arteries of the heart.

## 2021-06-25 ENCOUNTER — TELEPHONE (OUTPATIENT)
Dept: ENT CLINIC | Age: 76
End: 2021-06-25

## 2021-06-25 ENCOUNTER — DOCUMENTATION ONLY (OUTPATIENT)
Dept: CARDIOLOGY CLINIC | Age: 76
End: 2021-06-25

## 2021-06-25 LAB
ALBUMIN SERPL-MCNC: 3.8 G/DL (ref 3.7–4.7)
ALBUMIN/GLOB SERPL: 1.5 {RATIO} (ref 1.2–2.2)
ALP SERPL-CCNC: 68 IU/L (ref 48–121)
ALT SERPL-CCNC: 61 IU/L (ref 0–44)
AST SERPL-CCNC: 25 IU/L (ref 0–40)
BILIRUB SERPL-MCNC: 0.4 MG/DL (ref 0–1.2)
BUN SERPL-MCNC: 18 MG/DL (ref 8–27)
BUN/CREAT SERPL: 17 (ref 10–24)
CALCIUM SERPL-MCNC: 9.3 MG/DL (ref 8.6–10.2)
CHLORIDE SERPL-SCNC: 103 MMOL/L (ref 96–106)
CHOLEST SERPL-MCNC: 189 MG/DL (ref 100–199)
CO2 SERPL-SCNC: 29 MMOL/L (ref 20–29)
CREAT SERPL-MCNC: 1.06 MG/DL (ref 0.76–1.27)
ERYTHROCYTE [DISTWIDTH] IN BLOOD BY AUTOMATED COUNT: 13.2 % (ref 11.6–15.4)
GLOBULIN SER CALC-MCNC: 2.5 G/DL (ref 1.5–4.5)
GLUCOSE SERPL-MCNC: 110 MG/DL (ref 65–99)
HCT VFR BLD AUTO: 42.4 % (ref 37.5–51)
HDLC SERPL-MCNC: 52 MG/DL
HGB BLD-MCNC: 14.5 G/DL (ref 13–17.7)
IMP & REVIEW OF LAB RESULTS: NORMAL
LDLC SERPL CALC-MCNC: 112 MG/DL (ref 0–99)
MCH RBC QN AUTO: 29.7 PG (ref 26.6–33)
MCHC RBC AUTO-ENTMCNC: 34.2 G/DL (ref 31.5–35.7)
MCV RBC AUTO: 87 FL (ref 79–97)
PLATELET # BLD AUTO: 245 X10E3/UL (ref 150–450)
POTASSIUM SERPL-SCNC: 4.6 MMOL/L (ref 3.5–5.2)
PROT SERPL-MCNC: 6.3 G/DL (ref 6–8.5)
RBC # BLD AUTO: 4.89 X10E6/UL (ref 4.14–5.8)
SODIUM SERPL-SCNC: 141 MMOL/L (ref 134–144)
TRIGL SERPL-MCNC: 144 MG/DL (ref 0–149)
TSH SERPL DL<=0.005 MIU/L-ACNC: 1.38 UIU/ML (ref 0.45–4.5)
VLDLC SERPL CALC-MCNC: 25 MG/DL (ref 5–40)
WBC # BLD AUTO: 11.1 X10E3/UL (ref 3.4–10.8)

## 2021-06-25 NOTE — PROGRESS NOTES
Message  Received: Today  Rakesh December., NP sent to Prashanth June LPN  Just fyi--labs reviewed with pt. Noted, thanks.

## 2021-06-27 DIAGNOSIS — E55.9 VITAMIN D DEFICIENCY: Primary | ICD-10-CM

## 2021-06-27 RX ORDER — MELATONIN
1000 DAILY
Qty: 90 TABLET | Refills: 1 | Status: SHIPPED | OUTPATIENT
Start: 2021-06-27

## 2021-07-07 DIAGNOSIS — R79.89 LOW TESTOSTERONE IN MALE: Primary | ICD-10-CM

## 2021-07-12 ENCOUNTER — TELEPHONE (OUTPATIENT)
Dept: PRIMARY CARE CLINIC | Age: 76
End: 2021-07-12

## 2021-07-12 NOTE — TELEPHONE ENCOUNTER
Left message to call office. Please let patient know free and total testosterone is low.  Recommend he follow up with Endocrinology Dr. Hiro Doe to see if he is a candidate for treatment.       Dr. Ez López, [address] [place] [address] [address] Martina Jordan, [address]   Phone: [phone]

## 2022-10-12 NOTE — PROGRESS NOTES
[address] [place] [address], [address] [facility]  [phone]     Subjective:      Nadia Ritter is a 78 y.o. male is here for new patient consultation. Pmhx HTN, HLD and GERD. Denies hx smoking/etoh or illegal drug use. No family hx CAD. He reports intermittent episodes of nonexertional cp and palpitation. Symptoms lasts a few minutes, resolve on its own but wants to get checked. The patient denies shortness of breath, orthopnea, PND, LE edema, syncope, or presyncope. There are no problems to display for this patient. None  Past Medical History:   Diagnosis Date    Hyperlipidemia     Hypertension     Ill-defined condition     High cholesterol       Past Surgical History:   Procedure Laterality Date    COLONOSCOPY N/A 6/15/2021    COLONOSCOPY performed by Stephania Laureano MD at [facility] END[facility]COPY     No Known Allergies   History reviewed. No pertinent family history. Social History     Socioeconomic History    Marital status:      Spouse name: Not on file    Number of children: Not on file    Years of education: Not on file    Highest education level: Not on file   Occupational History    Not on file   Tobacco Use    Smoking status: Never Smoker    Smokeless tobacco: Never Used   Vaping Use    Vaping Use: Never used   Substance and Sexual Activity    Alcohol use: Never    Drug use: Never    Sexual activity: Not on file   Other Topics Concern    Not on file   Social History Narrative    Not on file     Social Determinants of Health     Financial Resource Strain:     Difficulty of Paying Living Expenses:    Food Insecurity:     Worried About Running Out of Food in the Last Year:     [address] in the Last Year:    Transportation Needs:     Lack of Transportation (Medical):      Lack of Transportation (Non-Medical):    Physical Activity:     Days of Exercise per Week:     Minutes of Exercise per Session:    Stress:     Feeling of Stress :    Social Reviewed below information with patient:     Patient aware of date, time and place of Covid test  Results pending    Informed that once they get their COVID test:   • Self -quarantine is recommended to minimize your risk of exposure before your procedure. If you are unable to self-quarantine, practice social distancing and perform good hand hygiene.  • Immediately report any new symptoms or suspected/known exposures to COVID-19 cases to your surgeon’s office    Patient is aware they will not get notified of a negative result    At this time, two visitors are allowed into the building. It will be an expectation for the patient/support person to wear a mask at all times during their stay. If the support person wants to leave during the surgical period, please communicate with nursing staff.    • Patient advised to bring a form of payment in the event that they wish to obtain medications from the hospital outpatient pharmacy following their procedure.              Connections:     Frequency of Communication with Friends and Family:     Frequency of Social Gatherings with Friends and Family:     Attends Temple Services:     Active Member of Clubs or Organizations:     Attends Club or Organization Meetings:     Marital Status:    Intimate Partner Violence:     Fear of Current or Ex-Partner:     Emotionally Abused:     Physically Abused:     Sexually Abused:       Current Outpatient Medications   Medication Sig    amoxicillin (AMOXIL) 500 mg capsule Take 500 mg by mouth two (2) times a day.  clarithromycin (BIAXIN) 500 mg tablet Take 500 mg by mouth two (2) times a day.  diclofenac EC (VOLTAREN) 75 mg EC tablet Take 75 mg by mouth as needed.  OMEPRAZOLE PO Take  by mouth two (2) times a day.  perindopril (ACEON) 4 mg tablet Take 4 mg by mouth daily.  atorvastatin (LIPITOR) 10 mg tablet Take  by mouth daily. No current facility-administered medications for this visit. Review of Symptoms:  11 systems reviewed, negative other than as stated in the HPI    Physical ExamPhysical Exam:    Vitals:    06/22/21 0945   BP: (!) 140/70   Pulse: 60   Resp: 16   SpO2: 99%   Weight: 180 lb 1.6 oz (81.7 kg)   Height: 5' 10\" (1.778 m)     Body mass index is 25.84 kg/m². General PE  Gen:  NAD  Mental Status - Alert. General Appearance - Not in acute distress. HEENT:  PERRL, no carotid bruits or JVD  Chest and Lung Exam   Inspection: Accessory muscles - No use of accessory muscles in breathing. Auscultation:   Breath sounds: - Normal.   Cardiovascular   Inspection: Jugular vein - Bilateral - Inspection Normal.   Palpation/Percussion:   Apical Impulse: - Normal.   Auscultation: Rhythm - Regular. Heart Sounds - S1 WNL and S2 WNL. No S3 or S4. Murmurs & Other Heart Sounds: Auscultation of the heart reveals - No Murmurs. Peripheral Vascular   Upper Extremity: Inspection - Bilateral - No Cyanotic nailbeds or Digital clubbing.    Lower Extremity: Palpation: Edema - Bilateral - No edema. Abdomen:   Soft, non-tender, bowel sounds are active. Neuro: A&O times 3, CN and motor grossly WNL    Labs:   No results found for: CHOL, CHOLX, CHLST, CHOLV, 427793, HDL, HDLP, LDL, LDLC, DLDLP, TGLX, TRIGL, TRIGP, CHHD, CHHDX  No results found for: CPK, CPKX, CPX  No results found for: NA, K, CL, CO2, AGAP, GLU, BUN, CREA, BUCR, GFRAA, GFRNA, CA, TBIL, TBILI, AP, TP, ALB, GLOB, AGRAT, ALT    EKG:         Assessment:     Assessment:      1. Precordial pain    2. Heart palpitations    3. Essential hypertension    4. Mixed hyperlipidemia        Orders Placed This Encounter    AMB POC EKG ROUTINE W/ 12 LEADS, INTER & REP     Order Specific Question:   Reason for Exam:     Answer:   routine    amoxicillin (AMOXIL) 500 mg capsule     Sig: Take 500 mg by mouth two (2) times a day.  clarithromycin (BIAXIN) 500 mg tablet     Sig: Take 500 mg by mouth two (2) times a day.  diclofenac EC (VOLTAREN) 75 mg EC tablet     Sig: Take 75 mg by mouth as needed.  OMEPRAZOLE PO     Sig: Take  by mouth two (2) times a day. Plan:     1. Precordial pain  Obtain ciara d/t underlying OA, unable to challenge treadmill    2. Palpitation  Obtain echo and 24 hr holter    2. HTN  Controlled with Perindopril 4 mg daily    3. HLD  On atorva 10 mg daily  Lipids and labs followed by PCP     Ned Rondon NP       Patient seen and examined by me with nurse practitioner. I personally performed all components of the history, physical, and medical decision making and agree with the assessment and plan as noted. C/o substernal chest pressure at times. BP controlled. Evaluate with Echo and leximyoivew. C/o palpitation, rare episodes, option of 30 day event monitor d/w patient. He is going out of country in couple of weeks, will schedule it once back in town. Check labs. On statin.      Alessandra Cruz MD

## 2023-05-15 RX ORDER — PERINDOPRIL ERBUMINE 4 MG/1
4 TABLET ORAL DAILY
COMMUNITY

## 2023-05-15 RX ORDER — CLARITHROMYCIN 500 MG/1
500 TABLET, COATED ORAL 2 TIMES DAILY
COMMUNITY
Start: 2021-06-17

## 2023-05-15 RX ORDER — AMOXICILLIN 500 MG/1
500 CAPSULE ORAL 2 TIMES DAILY
COMMUNITY
Start: 2021-06-17

## 2023-05-15 RX ORDER — DICLOFENAC SODIUM 75 MG/1
75 TABLET, DELAYED RELEASE ORAL PRN
COMMUNITY
Start: 2021-06-17

## 2023-05-15 RX ORDER — ATORVASTATIN CALCIUM 10 MG/1
TABLET, FILM COATED ORAL DAILY
COMMUNITY

## 2023-12-21 ENCOUNTER — HOSPITAL ENCOUNTER (OUTPATIENT)
Facility: HOSPITAL | Age: 78
Setting detail: RECURRING SERIES
Discharge: HOME | End: 2023-12-24
Payer: MEDICAID

## 2023-12-21 PROCEDURE — 97161 PT EVAL LOW COMPLEX 20 MIN: CPT

## 2023-12-21 NOTE — THERAPY EVALUATION
Physical Therapy at [facility],   a part of [address] [place] [address] Fredrick Castaneda  ZCUOA:[phone] QNN:[phone]                                                                            PHYSICAL THERAPY - MEDICARE EVALUATION/PLAN OF CARE NOTE (updated 3/23)      Date: 2023          Patient Name:  Giancarlo Kuhn :     Medical   Diagnosis:  Left knee pain [M25.562] Treatment Diagnosis:  M25.562  LEFT KNEE PAIN    Referral Source:  Zhang Mcdaniel MD Provider #:  [id number]                  Insurance: Payor: [employer] / Plan: Flavio Marcial / Product Type: *No Product type* /      Patient  verified yes     Visit #   Current  / Total 1 See auth   Time   In / Out 1:05 pm 1:45 pm   Total Treatment Time 40   Total Timed Codes 5   1:1 Treatment Time 5      [facility] Totals Reminder:  bill using total billable   min of TIMED therapeutic procedures and modalities. 8-22 min = 1 unit; 23-37 min = 2 units; 38-52 min = 3 units;  53-67 min = 4 units; 68-82 min = 5 units           SUBJECTIVE  Pain Level (0-10 scale): 6  []constant []intermittent []improving []worsening []no change since onset    Any medication changes, allergies to medications, adverse drug reactions, diagnosis change, or new procedure performed?: [x] No    [] Yes (see summary sheet for update)  Medications: Verified on Patient Summary List    Subjective functional status/changes:     Pt is s/p L TKA on 23. Pt reports he had pain for many years and finally opted for surgery. Pt has 5 steps to get in home and would like to get back to walking for exercise.  Pt did not have home health come to his house but he has been doing his exercises at home from the MD.    Start of Care: 2023  Onset Date: 23  Current symptoms/Complaints:  pain and stiffness  Mechanism of Injury: over time  PLOF: walking 1 hour per day  Limitations to PLOF/Activity or

## 2023-12-22 ENCOUNTER — HOSPITAL ENCOUNTER (OUTPATIENT)
Facility: HOSPITAL | Age: 78
Setting detail: RECURRING SERIES
Discharge: HOME | End: 2023-12-25
Payer: MEDICAID

## 2023-12-22 PROCEDURE — 97016 VASOPNEUMATIC DEVICE THERAPY: CPT

## 2023-12-22 PROCEDURE — 97140 MANUAL THERAPY 1/> REGIONS: CPT

## 2023-12-22 PROCEDURE — 97110 THERAPEUTIC EXERCISES: CPT

## 2023-12-22 NOTE — PROGRESS NOTES
PHYSICAL THERAPY - MEDICARE DAILY TREATMENT NOTE (updated 3/23)      Date: 2023          Patient Name:  Henok Canela :     Medical   Diagnosis:  Left knee pain [M25.562] Treatment Diagnosis:  M25.562  LEFT KNEE PAIN    Referral Source:  Rebecca Juárez MD Insurance:   Payor: [facility] / Plan: Jonas Valadez / Product Type: *No Product type* /                     Patient  verified yes     Visit #   Current  / Total 2 MN   Time   In / Out 10:00 am 10:50 am   Total Treatment Time 50   Total Timed Codes 40   1:1 Treatment Time 30      [facility] Totals Reminder:  bill using total billable   min of TIMED therapeutic procedures and modalities. 8-22 min = 1 unit; 23-37 min = 2 units; 38-52 min = 3 units; 53-67 min = 4 units; 68-82 min = 5 units            SUBJECTIVE    Pain Level (0-10 scale): 3    Any medication changes, allergies to medications, adverse drug reactions, diagnosis change, or new procedure performed?: [x] No    [] Yes (see summary sheet for update)  Medications: Verified on Patient Summary List    Subjective functional status/changes:     Pt states he is doing ok but still not sleeping well. OBJECTIVE      Therapeutic Procedures: Tx Min Billable or 1:1 Min (if diff from Tx Min) Procedure, Rationale, Specifics   25 15 06974 Therapeutic Exercise (timed):  increase ROM, strength, coordination, balance, and proprioception to improve patient's ability to progress to PLOF and address remaining functional goals. (see flow sheet as applicable)     Details if applicable:     15 15 15183 Manual Therapy (timed):  decrease pain, increase ROM, and increase tissue extensibility to improve patient's ability to progress to PLOF and address remaining functional goals. The manual therapy interventions were performed at a separate and distinct time from the therapeutic activities interventions .  (see flow sheet as applicable)     Details if applicable:  patellar

## 2023-12-27 ENCOUNTER — TELEPHONE (OUTPATIENT)
Dept: PRIMARY CARE CLINIC | Facility: CLINIC | Age: 78
End: 2023-12-27

## 2023-12-27 ENCOUNTER — HOSPITAL ENCOUNTER (OUTPATIENT)
Facility: HOSPITAL | Age: 78
Setting detail: RECURRING SERIES
Discharge: HOME | End: 2023-12-30
Payer: MEDICAID

## 2023-12-27 PROCEDURE — 97016 VASOPNEUMATIC DEVICE THERAPY: CPT

## 2023-12-27 PROCEDURE — 97140 MANUAL THERAPY 1/> REGIONS: CPT

## 2023-12-27 PROCEDURE — 97110 THERAPEUTIC EXERCISES: CPT

## 2023-12-27 NOTE — TELEPHONE ENCOUNTER
Tried calling patients daughter back - left a voicemail stating that Dr. Tiara Mireles is out of office this week and has a limited scheduled next week.  Offered an appointment for [facility]

## 2023-12-27 NOTE — TELEPHONE ENCOUNTER
Patient daughter calling to make hospital follow up for her father. The ED provider said it was important her father is seen because his potassium is low and will need lab redone. Please call [phone] or [phone].

## 2023-12-27 NOTE — PROGRESS NOTES
PHYSICAL THERAPY - MEDICARE DAILY TREATMENT NOTE (updated 3/23)      Date: 2023          Patient Name:  Kelli Manuel :  [age]   Medical   Diagnosis:  Left knee pain [M25.562] Treatment Diagnosis:  M25.562  LEFT KNEE PAIN    Referral Source:  Fco Bernard MD Insurance:   Payor: [employer] / Plan: [email or web address] / Product Type: *No Product type* /                     Patient  verified yes     Visit #   Current  / Total 3 MN   Time   In / Out 9:35 am 10:30 am   Total Treatment Time 55   Total Timed Codes 45   1:1 Treatment Time 39      [facility] Totals Reminder:  bill using total billable   min of TIMED therapeutic procedures and modalities. 8-22 min = 1 unit; 23-37 min = 2 units; 38-52 min = 3 units; 53-67 min = 4 units; 68-82 min = 5 units            SUBJECTIVE    Pain Level (0-10 scale): 3    Any medication changes, allergies to medications, adverse drug reactions, diagnosis change, or new procedure performed?: [x] No    [] Yes (see summary sheet for update)  Medications: Verified on Patient Summary List    Subjective functional status/changes:     Pt states he is still having pain in his knee. OBJECTIVE      Therapeutic Procedures: Tx Min Billable or 1:1 Min (if diff from Tx Min) Procedure, Rationale, Specifics   30  61405 Therapeutic Exercise (timed):  increase ROM, strength, coordination, balance, and proprioception to improve patient's ability to progress to PLOF and address remaining functional goals. (see flow sheet as applicable)     Details if applicable:     15  82466 Manual Therapy (timed):  decrease pain, increase ROM, and increase tissue extensibility to improve patient's ability to progress to PLOF and address remaining functional goals. The manual therapy interventions were performed at a separate and distinct time from the therapeutic activities interventions .  (see flow sheet as applicable)     Details if applicable:  patellar mobs, passive

## 2024-01-03 ENCOUNTER — HOSPITAL ENCOUNTER (OUTPATIENT)
Facility: HOSPITAL | Age: 79
Setting detail: RECURRING SERIES
Discharge: HOME | End: 2024-01-06
Payer: MEDICAID

## 2024-01-03 PROCEDURE — 97140 MANUAL THERAPY 1/> REGIONS: CPT

## 2024-01-03 PROCEDURE — 97110 THERAPEUTIC EXERCISES: CPT

## 2024-01-03 PROCEDURE — 97016 VASOPNEUMATIC DEVICE THERAPY: CPT

## 2024-01-03 NOTE — PROGRESS NOTES
PHYSICAL THERAPY - MEDICARE DAILY TREATMENT NOTE (updated 3/23)      Date: 1/3/2024          Patient Name:  Delicia Carpenter :  1945   Medical   Diagnosis:  Left knee pain [M25.562] Treatment Diagnosis:  M25.562  LEFT KNEE PAIN    Referral Source:  Long Hooks MD Insurance:   Payor: [facility] MEDICAID / Plan: [facility] HEALTHKEEPERS PLUS / Product Type: *No Product type* /                     Patient  verified yes     Visit #   Current  / Total 4 MN   Time   In / Out 9:35 am 10:25 am   Total Treatment Time 50   Total Timed Codes 40   1:1 Treatment Time 30      [facility] BC Totals Reminder:  bill using total billable   min of TIMED therapeutic procedures and modalities.   8-22 min = 1 unit; 23-37 min = 2 units; 38-52 min = 3 units; 53-67 min = 4 units; 68-82 min = 5 units            SUBJECTIVE    Pain Level (0-10 scale): 3    Any medication changes, allergies to medications, adverse drug reactions, diagnosis change, or new procedure performed?: [x] No    [] Yes (see summary sheet for update)  Medications: Verified on Patient Summary List    Subjective functional status/changes:     Pt states he is doing ok today w/ no new issues.    OBJECTIVE      Therapeutic Procedures:  Tx Min Billable or 1:1 Min (if diff from Tx Min) Procedure, Rationale, Specifics   30 20 01984 Therapeutic Exercise (timed):  increase ROM, strength, coordination, balance, and proprioception to improve patient's ability to progress to PLOF and address remaining functional goals. (see flow sheet as applicable)     Details if applicable:     10 10 91174 Manual Therapy (timed):  decrease pain, increase ROM, and increase tissue extensibility to improve patient's ability to progress to PLOF and address remaining functional goals.  The manual therapy interventions were performed at a separate and distinct time from the therapeutic activities interventions . (see flow sheet as applicable)     Details if applicable:  patellar mobs,

## 2024-01-05 ENCOUNTER — HOSPITAL ENCOUNTER (OUTPATIENT)
Facility: HOSPITAL | Age: 79
Setting detail: RECURRING SERIES
Discharge: HOME | End: 2024-01-08
Payer: MEDICAID

## 2024-01-05 PROCEDURE — 97016 VASOPNEUMATIC DEVICE THERAPY: CPT

## 2024-01-05 PROCEDURE — 97140 MANUAL THERAPY 1/> REGIONS: CPT

## 2024-01-05 PROCEDURE — 97110 THERAPEUTIC EXERCISES: CPT

## 2024-01-05 NOTE — PROGRESS NOTES
PHYSICAL THERAPY - MEDICARE DAILY TREATMENT NOTE (updated 3/23)      Date: 2024          Patient Name:  Delicia Carpenter :  1945   Medical   Diagnosis:  Left knee pain [M25.562] Treatment Diagnosis:  M25.562  LEFT KNEE PAIN    Referral Source:  Long Hooks MD Insurance:   Payor: [facility] MEDICAID / Plan: [facility] HEALTHKEEPERS PLUS / Product Type: *No Product type* /                     Patient  verified yes     Visit #   Current  / Total 5 MN   Time   In / Out 9:55 am 10:50 am   Total Treatment Time 55   Total Timed Codes 45   1:1 Treatment Time 30      [facility] BC Totals Reminder:  bill using total billable   min of TIMED therapeutic procedures and modalities.   8-22 min = 1 unit; 23-37 min = 2 units; 38-52 min = 3 units; 53-67 min = 4 units; 68-82 min = 5 units            SUBJECTIVE    Pain Level (0-10 scale): 3    Any medication changes, allergies to medications, adverse drug reactions, diagnosis change, or new procedure performed?: [x] No    [] Yes (see summary sheet for update)  Medications: Verified on Patient Summary List    Subjective functional status/changes:     Pt reports doing well overall.    OBJECTIVE      Therapeutic Procedures:  Tx Min Billable or 1:1 Min (if diff from Tx Min) Procedure, Rationale, Specifics   35 20 23408 Therapeutic Exercise (timed):  increase ROM, strength, coordination, balance, and proprioception to improve patient's ability to progress to PLOF and address remaining functional goals. (see flow sheet as applicable)     Details if applicable:     10 10 37569 Manual Therapy (timed):  decrease pain, increase ROM, and increase tissue extensibility to improve patient's ability to progress to PLOF and address remaining functional goals.  The manual therapy interventions were performed at a separate and distinct time from the therapeutic activities interventions . (see flow sheet as applicable)     Details if applicable:  patellar mobs, passive hamstrings

## 2024-01-08 ENCOUNTER — HOSPITAL ENCOUNTER (OUTPATIENT)
Facility: HOSPITAL | Age: 79
Setting detail: RECURRING SERIES
End: 2024-01-08
Payer: MEDICAID

## 2024-01-08 ENCOUNTER — HOSPITAL ENCOUNTER (OUTPATIENT)
Facility: HOSPITAL | Age: 79
Setting detail: RECURRING SERIES
Discharge: HOME | End: 2024-01-11
Payer: MEDICAID

## 2024-01-08 PROCEDURE — 97016 VASOPNEUMATIC DEVICE THERAPY: CPT

## 2024-01-08 PROCEDURE — 97110 THERAPEUTIC EXERCISES: CPT

## 2024-01-08 PROCEDURE — 97140 MANUAL THERAPY 1/> REGIONS: CPT

## 2024-01-09 NOTE — PROGRESS NOTES
applicable)     Details if applicable:  patellar mobs, passive hamstrings stretch, knee flexion and overpressure in to extension   40 30    Total Total         Modalities Rationale:     decrease edema, decrease inflammation, and decrease pain to improve patient's ability to progress to PLOF and address remaining functional goals.       min [] Estim Unattended,             type/location:       []  w/ice    []  w/heat        min [] Estim Attended,             type/location:       []  w/ice   []  w/heat         []  w/US   []  TENS insruct            min []  Mechanical Traction,        type/lbs:        []  pro      []  sup           []  int       []  cont            []  before manual           []  after manual     min []  Ultrasound,         settings/location:      min  unbilled []  Ice     []  Heat            location/position:    10     min [x]  Vasopneumatic Device,      press/temp: 34 deg/med   pre-treatment girth : 16\"   post-treatment girth : 16\"   measured at (landmark       location) : mid patella  If using vaso (only need to measure limb vaso being performed on)        min []  Other:      Skin assessment post-treatment (if applicable):    [x]  intact    []  redness- no adverse reaction                 []redness - adverse reaction:          [x]  Patient Education billed concurrently with other procedures   [x] Review HEP    [] Progressed/Changed HEP, detail:    [] Other detail:         Other Objective/Functional Measures  NT    Pain Level at end of session (0-10 scale): 2      Assessment:  Tolerated prone hang without increased pain and slightly improved AROM.  Patient will continue to benefit from skilled PT / OT services to modify and progress therapeutic interventions, analyze and address functional mobility deficits, analyze and address ROM deficits, analyze and address strength deficits, and analyze and address soft tissue restrictions to address functional deficits and attain remaining goals.    Progress

## 2024-01-10 ENCOUNTER — HOSPITAL ENCOUNTER (OUTPATIENT)
Facility: HOSPITAL | Age: 79
Setting detail: RECURRING SERIES
Discharge: HOME | End: 2024-01-13
Payer: MEDICAID

## 2024-01-10 PROCEDURE — 97110 THERAPEUTIC EXERCISES: CPT

## 2024-01-10 PROCEDURE — 97016 VASOPNEUMATIC DEVICE THERAPY: CPT

## 2024-01-10 PROCEDURE — 97140 MANUAL THERAPY 1/> REGIONS: CPT

## 2024-01-10 NOTE — PROGRESS NOTES
Progress Note/Recertification    NT    PLAN  Yes  Continue plan of care  Re-Cert Due: n/a  [x]  Upgrade activities as tolerated  []  Discharge due to :  []  Other:      Addi Hendrickson, PT       1/10/2024       2:50 PM

## 2024-01-15 ENCOUNTER — HOSPITAL ENCOUNTER (OUTPATIENT)
Facility: HOSPITAL | Age: 79
Setting detail: RECURRING SERIES
Discharge: HOME | End: 2024-01-18
Payer: MEDICAID

## 2024-01-15 PROCEDURE — 97110 THERAPEUTIC EXERCISES: CPT

## 2024-01-15 PROCEDURE — 97016 VASOPNEUMATIC DEVICE THERAPY: CPT

## 2024-01-15 PROCEDURE — 97140 MANUAL THERAPY 1/> REGIONS: CPT

## 2024-01-15 NOTE — PROGRESS NOTES
PHYSICAL THERAPY - MEDICARE DAILY TREATMENT NOTE (updated 3/23)      Date: 1/15/2024          Patient Name:  Delicia Carpenter :  1945   Medical   Diagnosis:  Left knee pain [M25.562] Treatment Diagnosis:  M25.562  LEFT KNEE PAIN    Referral Source:  Long Hooks MD Insurance:   Payor: [facility] MEDICAID / Plan: [facility] HEALTHKEEPERS PLUS / Product Type: *No Product type* /                     Patient  verified yes     Visit #   Current  / Total 8 MN   Time   In / Out 2:30 pm 3:40 pm   Total Treatment Time 70   Total Timed Codes 55   1:1 Treatment Time 55      [facility] Totals Reminder:  bill using total billable   min of TIMED therapeutic procedures and modalities.   8-22 min = 1 unit; 23-37 min = 2 units; 38-52 min = 3 units; 53-67 min = 4 units; 68-82 min = 5 units            SUBJECTIVE    Pain Level (0-10 scale): 0    Any medication changes, allergies to medications, adverse drug reactions, diagnosis change, or new procedure performed?: [x] No    [] Yes (see summary sheet for update)  Medications: Verified on Patient Summary List    Subjective functional status/changes:     States knee continues to improve.    OBJECTIVE  Active extension:  -7 degrees post extension stretch    Therapeutic Procedures:  Tx Min Billable or 1:1 Min (if diff from Tx Min) Procedure, Rationale, Specifics   40 40 07026 Therapeutic Exercise (timed):  increase ROM, strength, coordination, balance, and proprioception to improve patient's ability to progress to PLOF and address remaining functional goals. (see flow sheet as applicable)     Details if applicable:     15 15 04929 Manual Therapy (timed):  decrease pain, increase ROM, and increase tissue extensibility to improve patient's ability to progress to PLOF and address remaining functional goals.  The manual therapy interventions were performed at a separate and distinct time from the therapeutic activities interventions . (see flow sheet as applicable)     Details

## 2024-01-17 ENCOUNTER — HOSPITAL ENCOUNTER (OUTPATIENT)
Facility: HOSPITAL | Age: 79
Setting detail: RECURRING SERIES
Discharge: HOME | End: 2024-01-20
Payer: MEDICAID

## 2024-01-17 PROCEDURE — 97016 VASOPNEUMATIC DEVICE THERAPY: CPT

## 2024-01-17 PROCEDURE — 97140 MANUAL THERAPY 1/> REGIONS: CPT

## 2024-01-17 PROCEDURE — 97110 THERAPEUTIC EXERCISES: CPT

## 2024-01-17 NOTE — PROGRESS NOTES
PHYSICAL THERAPY - MEDICARE DAILY TREATMENT NOTE (updated 3/23)      Date: 2024          Patient Name:  Delicia Carpenter :  1945   Medical   Diagnosis:  Left knee pain [M25.562] Treatment Diagnosis:  M25.562  LEFT KNEE PAIN    Referral Source:  Long Hooks MD Insurance:   Payor: [facility] MEDICAID / Plan: [facility] HEALTHKEEPERS PLUS / Product Type: *No Product type* /                     Patient  verified yes     Visit #   Current  / Total 9 MN   Time   In / Out 2:25 pm 3:25 pm   Total Treatment Time 60   Total Timed Codes 50   1:1 Treatment Time 50      [facility] BC Totals Reminder:  bill using total billable   min of TIMED therapeutic procedures and modalities.   8-22 min = 1 unit; 23-37 min = 2 units; 38-52 min = 3 units; 53-67 min = 4 units; 68-82 min = 5 units            SUBJECTIVE    Pain Level (0-10 scale): 0    Any medication changes, allergies to medications, adverse drug reactions, diagnosis change, or new procedure performed?: [x] No    [] Yes (see summary sheet for update)  Medications: Verified on Patient Summary List    Subjective functional status/changes:     Pt states he was sore for 3-4 days after last session due to performing the leg press machine and he does not want to perform that today.    OBJECTIVE  Active extension:  -7 degrees post extension stretch    Therapeutic Procedures:  Tx Min Billable or 1:1 Min (if diff from Tx Min) Procedure, Rationale, Specifics   35  17199 Therapeutic Exercise (timed):  increase ROM, strength, coordination, balance, and proprioception to improve patient's ability to progress to PLOF and address remaining functional goals. (see flow sheet as applicable)     Details if applicable:     05 08044 Manual Therapy (timed):  decrease pain, increase ROM, and increase tissue extensibility to improve patient's ability to progress to PLOF and address remaining functional goals.  The manual therapy interventions were performed at a separate and

## 2024-01-22 ENCOUNTER — HOSPITAL ENCOUNTER (OUTPATIENT)
Facility: HOSPITAL | Age: 79
Setting detail: RECURRING SERIES
Discharge: HOME | End: 2024-01-25
Payer: MEDICAID

## 2024-01-22 PROCEDURE — 97016 VASOPNEUMATIC DEVICE THERAPY: CPT

## 2024-01-22 PROCEDURE — 97110 THERAPEUTIC EXERCISES: CPT

## 2024-01-22 PROCEDURE — 97140 MANUAL THERAPY 1/> REGIONS: CPT

## 2024-01-22 NOTE — PROGRESS NOTES
patellar mobs   Passive extension stretch x2  STM hamstrings (omit)  Seated anterior and posterior glides (omit)     45     Total Total         Modalities Rationale:     decrease edema, decrease inflammation, and decrease pain to improve patient's ability to progress to PLOF and address remaining functional goals.       min [] Estim Unattended,             type/location:       []  w/ice    []  w/heat        min [] Estim Attended,             type/location:       []  w/ice   []  w/heat         []  w/US   []  TENS insruct            min []  Mechanical Traction,        type/lbs:        []  pro      []  sup           []  int       []  cont            []  before manual           []  after manual     min []  Ultrasound,         settings/location:      min  unbilled []  Ice     []  Heat            location/position:    10     min [x]  Vasopneumatic Device,      press/temp: 34 deg/med   pre-treatment girth : 16\"   post-treatment girth : 16\"   measured at (landmark       location) : mid patella  If using vaso (only need to measure limb vaso being performed on)        min []  Other:      Skin assessment post-treatment (if applicable):    [x]  intact    []  redness- no adverse reaction                 []redness - adverse reaction:          [x]  Patient Education billed concurrently with other procedures   [x] Review HEP    [] Progressed/Changed HEP, detail:    [] Other detail:         Other Objective/Functional Measures  NT    Pain Level at end of session (0-10 scale): 1    Assessment:  Pt tolerated there-ex well and demonstrates improved knee extension ROM. Pt progressing well.  Patient will continue to benefit from skilled PT / OT services to modify and progress therapeutic interventions, analyze and address functional mobility deficits, analyze and address ROM deficits, analyze and address strength deficits, and analyze and address soft tissue restrictions to address functional deficits and attain remaining

## 2024-01-24 ENCOUNTER — ANESTHESIA (OUTPATIENT)
Facility: HOSPITAL | Age: 79
End: 2024-01-24
Payer: MEDICAID

## 2024-01-24 ENCOUNTER — ANESTHESIA EVENT (OUTPATIENT)
Facility: HOSPITAL | Age: 79
End: 2024-01-24
Payer: MEDICAID

## 2024-01-24 ENCOUNTER — HOSPITAL ENCOUNTER (OUTPATIENT)
Facility: HOSPITAL | Age: 79
Setting detail: OUTPATIENT SURGERY
Discharge: HOME | End: 2024-01-24
Attending: INTERNAL MEDICINE | Admitting: INTERNAL MEDICINE
Payer: MEDICAID

## 2024-01-24 ENCOUNTER — APPOINTMENT (OUTPATIENT)
Facility: HOSPITAL | Age: 79
End: 2024-01-24
Payer: MEDICAID

## 2024-01-24 VITALS
SYSTOLIC BLOOD PRESSURE: 128 MMHG | OXYGEN SATURATION: 100 % | TEMPERATURE: 98.7 F | WEIGHT: 178.13 LBS | RESPIRATION RATE: 18 BRPM | HEIGHT: 70 IN | DIASTOLIC BLOOD PRESSURE: 68 MMHG | HEART RATE: 68 BPM | BODY MASS INDEX: 25.5 KG/M2

## 2024-01-24 PROCEDURE — 3600007512: Performed by: INTERNAL MEDICINE

## 2024-01-24 PROCEDURE — 7100000010 HC PHASE II RECOVERY - FIRST 15 MIN: Performed by: INTERNAL MEDICINE

## 2024-01-24 PROCEDURE — 7100000011 HC PHASE II RECOVERY - ADDTL 15 MIN: Performed by: INTERNAL MEDICINE

## 2024-01-24 PROCEDURE — 3700000000 HC ANESTHESIA ATTENDED CARE: Performed by: INTERNAL MEDICINE

## 2024-01-24 PROCEDURE — 6360000002 HC RX W HCPCS: Performed by: NURSE ANESTHETIST, CERTIFIED REGISTERED

## 2024-01-24 PROCEDURE — 3700000001 HC ADD 15 MINUTES (ANESTHESIA): Performed by: INTERNAL MEDICINE

## 2024-01-24 PROCEDURE — 88305 TISSUE EXAM BY PATHOLOGIST: CPT

## 2024-01-24 PROCEDURE — 3600007502: Performed by: INTERNAL MEDICINE

## 2024-01-24 RX ORDER — SODIUM CHLORIDE 0.9 % (FLUSH) 0.9 %
5-40 SYRINGE (ML) INJECTION PRN
Status: DISCONTINUED | OUTPATIENT
Start: 2024-01-24 | End: 2024-01-24 | Stop reason: HOSPADM

## 2024-01-24 RX ORDER — PROPOFOL 10 MG/ML
INJECTION, EMULSION INTRAVENOUS PRN
Status: DISCONTINUED | OUTPATIENT
Start: 2024-01-24 | End: 2024-01-24 | Stop reason: SDUPTHER

## 2024-01-24 RX ORDER — SODIUM CHLORIDE 9 MG/ML
25 INJECTION, SOLUTION INTRAVENOUS PRN
Status: DISCONTINUED | OUTPATIENT
Start: 2024-01-24 | End: 2024-01-24 | Stop reason: HOSPADM

## 2024-01-24 RX ORDER — FAMOTIDINE 20 MG/1
20 TABLET, FILM COATED ORAL DAILY
COMMUNITY

## 2024-01-24 RX ORDER — SODIUM CHLORIDE 0.9 % (FLUSH) 0.9 %
5-40 SYRINGE (ML) INJECTION EVERY 12 HOURS SCHEDULED
Status: DISCONTINUED | OUTPATIENT
Start: 2024-01-24 | End: 2024-01-24 | Stop reason: HOSPADM

## 2024-01-24 RX ADMIN — PROPOFOL 100 MG: 10 INJECTION, EMULSION INTRAVENOUS at 13:22

## 2024-01-24 RX ADMIN — PROPOFOL 150 MCG/KG/MIN: 10 INJECTION, EMULSION INTRAVENOUS at 13:23

## 2024-01-24 ASSESSMENT — PAIN - FUNCTIONAL ASSESSMENT: PAIN_FUNCTIONAL_ASSESSMENT: 0-10

## 2024-01-24 NOTE — H&P
[facility]  Karri Smith M.D.  [phone]            History and Physical       NAME:  Delicia Carpenter   :   1945   MRN:   [id number]       Referring Physician:  Josh Brandt MD      Consult Date: 2024 1:07 PM    Chief Complaint:  Colon cancer screening    History of Present Illness:  Patient is a 78 y.o. who is seen for colon cancer screening. Denies any ongoing GI complaints.      PMH:  Past Medical History:   Diagnosis Date    Hyperlipidemia     Hypertension     Ill-defined condition     High cholesterol        PSH:  Past Surgical History:   Procedure Laterality Date    COLONOSCOPY N/A 6/15/2021    COLONOSCOPY performed by Karri Smith MD at [facility] ENDOSCOPY       Allergies:  No Known Allergies    Home Medications:  Prior to Admission Medications   Prescriptions Last Dose Informant Patient Reported? Taking?   OMEPRAZOLE PO Not Taking  Yes No   Sig: Take by mouth 2 times daily   Patient not taking: Reported on 2024   amoxicillin (AMOXIL) 500 MG capsule Not Taking  Yes No   Sig: Take 1 capsule by mouth 2 times daily   Patient not taking: Reported on 2024   atorvastatin (LIPITOR) 10 MG tablet 2024  Yes No   Sig: Take by mouth daily   clarithromycin (BIAXIN) 500 MG tablet Not Taking  Yes No   Sig: Take 1 tablet by mouth 2 times daily   Patient not taking: Reported on 2024   diclofenac (VOLTAREN) 75 MG EC tablet Past Month  Yes No   Sig: Take 1 tablet by mouth as needed   famotidine (PEPCID) 20 MG tablet 2024  Yes Yes   Sig: Take 1 tablet by mouth daily   perindopril (ACEON) 4 MG tablet 2024  Yes No   Sig: Take 1 tablet by mouth daily   vitamin D (CHOLECALCIFEROL) 25 MCG (1000 UT) TABS tablet   Yes No   Sig: Take 1 tablet by mouth daily      Facility-Administered Medications: None       Hospital Medications:  No current facility-administered medications for this encounter.       Social History:  Social History     Tobacco Use    Smoking status:

## 2024-01-24 NOTE — PROGRESS NOTES
Delicia Carpenter  1945  [id number]    Situation:  Verbal report received from: Bobo DEUTSCH  Procedure: Procedure(s):  COLONOSCOPY  COLONOSCOPY POLYPECTOMY REMOVAL SNARE/STOMA    Background:    Preoperative diagnosis: Screening for colon cancer [Z12.11]  Postoperative diagnosis: * No post-op diagnosis entered *    :  Dr. Smith  Assistant(s): Circulator: aFrrah Vila RN    Specimens:   ID Type Source Tests Collected by Time Destination   1 : Ascending colon polyp Tissue Colon-Ascending SURGICAL PATHOLOGY Karri Smith MD 1/24/2024 1331    2 : Sigmoid colon polyp Tissue Sigmoid Colon SURGICAL PATHOLOGY Karri Smith MD 1/24/2024 1335      H. Pylori  No    Assessment:  Intra-procedure medications   Anesthesia gave intra-procedure sedation and medications, see anesthesia flow sheet Yes    Intravenous fluids: NS@ KVO     Vital signs stable yes    Abdominal assessment: round and soft  yes    Recommendation:  Discharge patient per MD order yes.  Family or Friend - family  Permission to share finding with family or friend Yes

## 2024-01-24 NOTE — PROGRESS NOTES

## 2024-01-24 NOTE — DISCHARGE INSTRUCTIONS
2024    Delicia Carpenter  :  1945  [place] Medical Record Number:  [id number]      COLONOSCOPY FINDINGS:  Your colonoscopy showed: 3 polyps removed and diverticulosis noted.    DISCOMFORT:  Redness at IV site- apply warm compress to area; if redness or soreness persist- contact your physician  There may be a slight amount of blood passed from the rectum  Gaseous discomfort- walking, belching will help relieve any discomfort  You may not operate a vehicle for 12 hours  You may not engage in an occupation involving machinery or appliances for rest of today  You may not drink alcoholic beverages for at least 12 hours  Avoid making any critical decisions for at least 24 hour  DIET:   regular   - however -  remember your colon is empty and a heavy meal will produce gas.   Avoid these foods:  vegetables, fried / greasy foods, carbonated drinks for today     ACTIVITY:  You may resume your normal daily activities it is recommended that you spend the remainder of the day resting -  avoid any strenuous activity.    CALL M.D.  ANY SIGN OF:   Increasing pain, nausea, vomiting  Abdominal distension (swelling)  New increased bleeding (oral or rectal)  Fever (chills)  Pain in chest area  Bloody discharge from nose or mouth   Shortness of breath    Follow-up Instructions:   Call Dr. Smith if any questions or problems.   Telephone # [phone]  Biopsy results will be available in  5 to 7 days  Should have a repeat colonoscopy in 3 years.

## 2024-01-24 NOTE — PROCEDURES
[facility]  Karri Smith M.D.  [phone]            2024          Colonoscopy Operative Report  Delicia Carpenter  :  1945  [place] Medical Record Number:  [id number]      Indications:    Personal history of colon polyps (screening only)     :  Karri Smith MD    Assistant -- None  Implants -- None    Referring Provider: Josh Brandt MD    Sedation:  MAC anesthesia Propofol    Pre-Procedural Exam:      Airway: clear,  No airway problems anticipated  Heart: RRR, without gallops or rubs  Lungs: clear bilaterally without wheezes, crackles, or rhonchi  Abdomen: soft, nontender, nondistended, bowel sounds present  Mental Status: awake, alert and oriented to person, place and time     Procedure Details:  After informed consent was obtained with all risks and benefits of procedure explained and preoperative exam completed, the patient was taken to the endoscopy suite and placed in the left lateral decubitus position.  Upon sequential sedation as per above, a digital rectal exam was performed. The Olympus videocolonoscope  was inserted in the rectum and carefully advanced to the terminal ileum.  The quality of preparation was good.  The colonoscope was slowly withdrawn with careful inspection and evaluation between folds. Retroflexion in the rectum was performed.    Findings:   Terminal Ileum: normal  Cecum: normal  Ascending Colon: 2  Sessile polyp(s), the largest 10 mm in size;  Transverse Colon: normal  Descending Colon: normal  Sigmoid: 1  Sessile polyp(s), the largest 6 mm in size;      -Diverticulosis  Rectum: normal    Interventions:  3 complete polypectomy were performed using cold snare  and the polyps were  retrieved    Specimen Removed:  specimen #1, 8-10 mm in size, located in the ascending colon removed by cold snare and retrieved for pathology  #2, 6 mm in size, located in the sigmoid removed by cold snare and retrieved for pathology    Complications:

## 2024-01-24 NOTE — ANESTHESIA POSTPROCEDURE EVALUATION
Department of Anesthesiology  Postprocedure Note    Patient: Delicia Carpenter  MRN: [id number]  YOB: 1945  Date of evaluation: 1/24/2024    Procedure Summary       Date: 01/24/24 Room / Location: [facility] 02 / [facility] ENDOSCOPY    Anesthesia Start: 1317 Anesthesia Stop: 1341    Procedures:       COLONOSCOPY (Lower GI Region)      COLONOSCOPY POLYPECTOMY REMOVAL SNARE/STOMA (Lower GI Region) Diagnosis:       Screening for colon cancer      (Screening for colon cancer [Z12.11])    Surgeons: Karri Smith MD Responsible Provider: Harry Mchugh MD    Anesthesia Type: MAC ASA Status: 2            Anesthesia Type: MAC    Carl Phase I: Carl Score: 10    Carl Phase II: Carl Score: 10    Anesthesia Post Evaluation    Patient location during evaluation: bedside  Airway patency: patent  Nausea & Vomiting: no nausea and no vomiting  Cardiovascular status: hemodynamically stable  Respiratory status: acceptable  Comments: VITALS REVIEWED    No notable events documented.

## 2024-01-24 NOTE — PROGRESS NOTES
Endoscopy discharge instructions have been reviewed and given to patient.  The patient verbalized understanding and acceptance of instructions.      Dr. Smith discussed with patient and family procedure findings and next steps.

## 2024-01-24 NOTE — ANESTHESIA PRE PROCEDURE
Department of Anesthesiology  Preprocedure Note       Name:  Delicia Carpenter   Age:  78 y.o.  :  1945                                          MRN:  [id number]         Date:  2024      Surgeon: Surgeon(s):  Karri Smith MD    Procedure: Procedure(s):  COLONOSCOPY    Medications prior to admission:   Prior to Admission medications    Medication Sig Start Date End Date Taking? Authorizing Provider   famotidine (PEPCID) 20 MG tablet Take 1 tablet by mouth daily   Yes Provider, MD Blaire   OMEPRAZOLE PO Take by mouth 2 times daily  Patient not taking: Reported on 2024    Automatic Reconciliation, Ar   amoxicillin (AMOXIL) 500 MG capsule Take 1 capsule by mouth 2 times daily  Patient not taking: Reported on 2024   Automatic Reconciliation, Ar   atorvastatin (LIPITOR) 10 MG tablet Take by mouth daily    Automatic Reconciliation, Ar   vitamin D (CHOLECALCIFEROL) 25 MCG (1000 UT) TABS tablet Take 1 tablet by mouth daily 21   Automatic Reconciliation, Ar   clarithromycin (BIAXIN) 500 MG tablet Take 1 tablet by mouth 2 times daily  Patient not taking: Reported on 2024   Automatic Reconciliation, Ar   diclofenac (VOLTAREN) 75 MG EC tablet Take 1 tablet by mouth as needed 21   Automatic Reconciliation, Ar   perindopril (ACEON) 4 MG tablet Take 1 tablet by mouth daily    Automatic Reconciliation, Ar       Current medications:    No current facility-administered medications for this encounter.       Allergies:  No Known Allergies    Problem List:  There is no problem list on file for this patient.      Past Medical History:        Diagnosis Date   • Hyperlipidemia    • Hypertension    • Ill-defined condition     High cholesterol        Past Surgical History:        Procedure Laterality Date   • COLONOSCOPY N/A 6/15/2021    COLONOSCOPY performed by Karri Smith MD at [facility] ENDOSCOPY       Social History:    Social History     Tobacco Use   • Smoking status: Never   •

## 2024-01-26 ENCOUNTER — HOSPITAL ENCOUNTER (OUTPATIENT)
Facility: HOSPITAL | Age: 79
Setting detail: RECURRING SERIES
Discharge: HOME | End: 2024-01-29
Payer: MEDICAID

## 2024-01-26 PROCEDURE — 97016 VASOPNEUMATIC DEVICE THERAPY: CPT

## 2024-01-26 PROCEDURE — 97140 MANUAL THERAPY 1/> REGIONS: CPT

## 2024-01-26 PROCEDURE — 97110 THERAPEUTIC EXERCISES: CPT

## 2024-01-26 NOTE — PROGRESS NOTES
PHYSICAL THERAPY - MEDICARE DAILY TREATMENT NOTE (updated 3/23)      Date: 2024          Patient Name:  Delicia Carpenter :  1945   Medical   Diagnosis:  Left knee pain [M25.562] Treatment Diagnosis:  M25.562  LEFT KNEE PAIN    Referral Source:  Long Hooks MD Insurance:   Payor: [facility] MEDICAID / Plan: [facility] HE[employer]KEEPERS PLUS / Product Type: *No Product type* /                     Patient  verified yes     Visit #   Current  / Total 11 MN   Time   In / Out 9:10 am 10:00 am   Total Treatment Time 50   Total Timed Codes 40   1:1 Treatment Time 30      [facility] Totals Reminder:  bill using total billable   min of TIMED therapeutic procedures and modalities.   8-22 min = 1 unit; 23-37 min = 2 units; 38-52 min = 3 units; 53-67 min = 4 units; 68-82 min = 5 units            SUBJECTIVE    Pain Level (0-10 scale): 0    Any medication changes, allergies to medications, adverse drug reactions, diagnosis change, or new procedure performed?: [x] No    [] Yes (see summary sheet for update)  Medications: Verified on Patient Summary List    Subjective functional status/changes:     Pt states he is doing ok but still sore.    OBJECTIVE  Active extension:  -4 degrees post extension stretch    Therapeutic Procedures:  Tx Min Billable or 1:1 Min (if diff from Tx Min) Procedure, Rationale, Specifics   [id number] Therapeutic Exercise (timed):  increase ROM, strength, coordination, balance, and proprioception to improve patient's ability to progress to PLOF and address remaining functional goals. (see flow sheet as applicable)     Details if applicable:     15 15 [id number] Manual Therapy (timed):  decrease pain, increase ROM, and increase tissue extensibility to improve patient's ability to progress to PLOF and address remaining functional goals.  The manual therapy interventions were performed at a separate and distinct time from the therapeutic activities interventions . (see flow sheet as applicable)

## 2024-01-29 ENCOUNTER — APPOINTMENT (OUTPATIENT)
Facility: HOSPITAL | Age: 79
End: 2024-01-29
Payer: MEDICAID

## 2024-01-31 ENCOUNTER — APPOINTMENT (OUTPATIENT)
Facility: HOSPITAL | Age: 79
End: 2024-01-31
Payer: MEDICAID

## (undated) DEVICE — BITEBLOCK ENDOSCP 60FR MAXI WHT POLYETH STURDY W/ VELC WVN

## (undated) DEVICE — SNARE ENDOSCP M L240CM W27MM SHTH DIA2.4MM CHN 2.8MM OVL

## (undated) DEVICE — BAG SPEC BIOHZRD 10 X 10 IN --

## (undated) DEVICE — CONTAINER SPEC 20 ML LID NEUT BUFF FORMALIN 10 % POLYPR STS

## (undated) DEVICE — SOLIDIFIER MEDC 1200ML -- CONVERT TO 356117

## (undated) DEVICE — BAG BELONG PT PERS CLEAR HANDL

## (undated) DEVICE — POLYP TRAP: Brand: TRAPEASE®

## (undated) DEVICE — CANN NASAL O2 CAPNOGRAPHY AD -- FILTERLINE

## (undated) DEVICE — CANNULA CUSH AD W/ 14FT TBG

## (undated) DEVICE — SIMPLICITY FLUFF UNDERPAD 23X36, MODERATE: Brand: SIMPLICITY

## (undated) DEVICE — SYR 3ML LL TIP 1/10ML GRAD --

## (undated) DEVICE — Device

## (undated) DEVICE — KIT COLON W/ 1.1OZ LUB AND 2 END

## (undated) DEVICE — ADULT SPO2 SENSOR,REMANUFACTURED,REPROCESSED DEVICE FOR SINGLE USE; REPROCESSED BY COVIDIEN LLC: Brand: NELLCOR

## (undated) DEVICE — CATH IV AUTOGRD BC PNK 20GA 25 -- INSYTE

## (undated) DEVICE — ELECTRODE,RADIOTRANSLUCENT,FOAM,3PK: Brand: MEDLINE

## (undated) DEVICE — FORCEPS BX L240CM JAW DIA2.8MM L CAP W/ NDL MIC MESH TOOTH

## (undated) DEVICE — SET ADMIN 16ML TBNG L100IN 2 Y INJ SITE IV PIGGY BK DISP

## (undated) DEVICE — 1200 GUARD II KIT W/5MM TUBE W/O VAC TUBE: Brand: GUARDIAN

## (undated) DEVICE — NDL PRT INJ NSAF BLNT 18GX1.5 --